# Patient Record
(demographics unavailable — no encounter records)

---

## 2025-05-16 NOTE — HISTORY OF PRESENT ILLNESS
[FreeTextEntry1] : Pt is a , LMP 3/26/25, Pt is here for her annual exam and confirmation of her pregnancy. No abnormal bleeding or pain   Ob/Gyn Hx - Nsd x2 then c/s for twins, twin A breech , largest 8-9 Med/Surg -Otherwise n/c  nkda no meds

## 2025-05-16 NOTE — PHYSICAL EXAM
[Chaperoned Physical Exam] : A chaperone was present in the examining room during all aspects of the physical examination. [MA] : MA [FreeTextEntry2] : Bradley  [Appropriately responsive] : appropriately responsive [Alert] : alert [No Acute Distress] : no acute distress [No Lymphadenopathy] : no lymphadenopathy [Soft] : soft [Non-tender] : non-tender [Non-distended] : non-distended [No HSM] : No HSM [No Lesions] : no lesions [No Mass] : no mass [Oriented x3] : oriented x3 [Examination Of The Breasts] : a normal appearance [No Masses] : no breast masses were palpable [Labia Majora] : normal [Labia Minora] : normal [Normal] : normal [Uterine Adnexae] : normal

## 2025-05-16 NOTE — PLAN
[FreeTextEntry1] : Normal Exam, Clear IUG seen, may need to revise EDC, will want TOLAC -Pap Done , Pn sent from office  -Gc/Ct sent  -Bse rev -Pregnancy precautions , Counseling, PN Vits -Vaccines Discussed  -Aneuploidy /NTS screening refused  -request op report  -rv 2 wks for f/u viability and edc

## 2025-05-16 NOTE — PROCEDURE
[Cervical Pap Smear] : cervical Pap smear [Liquid Base] : liquid base [GC & Chlamydia via Pap] : GC & Chlamydia via Pap [Tolerated Well] : the patient tolerated the procedure well [No Complications] : there were no complications [Transvaginal OB Sonogram] : Transvaginal OB Sonogram [Intrauterine Pregnancy] : intrauterine pregnancy [Yolk Sac] : yolk sac present [Fetal Heart] : no fetal heart [FreeTextEntry1] : Clear IUG seen w/ yolk sac and small pole 2.7mm-5.6 wks, flutter seen.